# Patient Record
Sex: MALE | Race: WHITE | HISPANIC OR LATINO | URBAN - METROPOLITAN AREA
[De-identification: names, ages, dates, MRNs, and addresses within clinical notes are randomized per-mention and may not be internally consistent; named-entity substitution may affect disease eponyms.]

---

## 2021-10-21 ENCOUNTER — EMERGENCY (EMERGENCY)
Facility: HOSPITAL | Age: 31
LOS: 1 days | Discharge: HOME | End: 2021-10-21
Attending: EMERGENCY MEDICINE
Payer: COMMERCIAL

## 2021-10-21 VITALS
HEIGHT: 69 IN | HEART RATE: 73 BPM | TEMPERATURE: 99 F | WEIGHT: 154.98 LBS | SYSTOLIC BLOOD PRESSURE: 169 MMHG | RESPIRATION RATE: 18 BRPM | OXYGEN SATURATION: 100 % | DIASTOLIC BLOOD PRESSURE: 93 MMHG

## 2021-10-21 PROCEDURE — 99284 EMERGENCY DEPT VISIT MOD MDM: CPT

## 2021-10-21 PROCEDURE — 99283 EMERGENCY DEPT VISIT LOW MDM: CPT

## 2021-10-21 RX ORDER — METHOCARBAMOL 500 MG/1
1 TABLET, FILM COATED ORAL
Qty: 16 | Refills: 0
Start: 2021-10-21 | End: 2021-10-24

## 2021-10-21 RX ORDER — METHOCARBAMOL 500 MG/1
750 TABLET, FILM COATED ORAL ONCE
Refills: 0 | Status: COMPLETED | OUTPATIENT
Start: 2021-10-21 | End: 2021-10-21

## 2021-10-21 RX ORDER — ACETAMINOPHEN 500 MG
650 TABLET ORAL ONCE
Refills: 0 | Status: COMPLETED | OUTPATIENT
Start: 2021-10-21 | End: 2021-10-21

## 2021-10-21 RX ORDER — LIDOCAINE 4 G/100G
1 CREAM TOPICAL ONCE
Refills: 0 | Status: COMPLETED | OUTPATIENT
Start: 2021-10-21 | End: 2021-10-21

## 2021-10-21 RX ORDER — LIDOCAINE 4 G/100G
1 CREAM TOPICAL
Qty: 5 | Refills: 0
Start: 2021-10-21 | End: 2021-10-25

## 2021-10-21 RX ADMIN — LIDOCAINE 1 PATCH: 4 CREAM TOPICAL at 09:41

## 2021-10-21 RX ADMIN — Medication 650 MILLIGRAM(S): at 09:40

## 2021-10-21 RX ADMIN — METHOCARBAMOL 750 MILLIGRAM(S): 500 TABLET, FILM COATED ORAL at 09:40

## 2021-10-21 NOTE — ED PROVIDER NOTE - PATIENT PORTAL LINK FT
You can access the FollowMyHealth Patient Portal offered by [facility] by registering at the following website: [email or web address]. By joining [employer]’s FollowMyHealth portal, you will also be able to view your health information using other applications (apps) compatible with our system.

## 2021-10-21 NOTE — ED PROVIDER NOTE - OBJECTIVE STATEMENT
31 year old male with no PMHx presents to the ED for R sided lower back pain x 1 day which started after the pt lifted something heavy. Denies fevers, chills, bowl or bladder incontinence, weakness or numbness. Pt is able to walk. Took Motrin prior to arrival to the ED. No h/o injuries to the back.

## 2021-10-21 NOTE — ED ADULT TRIAGE NOTE - CHIEF COMPLAINT QUOTE
pt a+ox3, ambulatory into ED c/o low back pain since this morning. denies any falls or recent trauma.

## 2021-10-21 NOTE — ED PROVIDER NOTE - ATTENDING CONTRIBUTION TO CARE
HPI: Pt with low back pain x 1 day s/p moving heavy container, radiating down R leg, worse with bending, took ibuprofen this morning. No urinary incontinence/retention, no saddle anesthesia, no fevers, no h/o IVDU. No numbness/tingling/weakness.     PE:  Gen: NAD  Head: NCAT  HEENT: Oral mucosa moist, normal conjunctiva  CV: RRR no murmurs, normal perfusion  Resp: CTA b/l, no wheezing, rales, rhonchi, no respiratory distress  GI: Abd Soft NTND  Neuro: , no midline c/t/l spine tenderness, strength 5/5 in all 4 extremities, sensation intact throughout  MSK: FROM all 4 extremities, no deformity  Skin: No rash, no bruising  Psych: Normal affect    MDM: Patient with low back pain x 1 day, likely radicular pain, no red flags, neuro intact, dc home with spine f/u. Shannan Adams DO     I performed a history and physical exam of the patient and discussed their management with the advanced care provider. I reviewed the advanced care provider's note and agree with the documented findings and plan of care. My medical decision making and objective findings are found above.

## 2021-10-21 NOTE — ED PROVIDER NOTE - NSFOLLOWUPINSTRUCTIONS_ED_ALL_ED_FT
Take medication as prescribed  Follow up with primary medical doctor in 2-3 days     Back Pain    Back pain is very common in adults. The cause of back pain is rarely dangerous and the pain often gets better over time. The cause of your back pain may not be known and may include strain of muscles or ligaments, degeneration of the spinal disks, or arthritis. Occasionally the pain may radiate down your leg(s). Over-the-counter medicines to reduce pain and inflammation are often the most helpful. Stretching and remaining active frequently helps the healing process.     SEEK IMMEDIATE MEDICAL CARE IF YOU HAVE ANY OF THE FOLLOWING SYMPTOMS: bowel or bladder control problems, unusual weakness or numbness in your arms or legs, nausea or vomiting, abdominal pain, fever, dizziness/lightheadedness.

## 2021-10-21 NOTE — ED PROVIDER NOTE - NSFOLLOWUPCLINICS_GEN_ALL_ED_FT
[id number] [place] [address]  Phone: [phone]  Fax:      [id number] [place] [address]  Phone: [phone]  Fax:     [facility]  [facility]  [address] [place] [id number]  Phone: [phone]  Fax:

## 2021-10-29 PROBLEM — Z00.00 ENCOUNTER FOR PREVENTIVE HEALTH EXAMINATION: Status: ACTIVE | Noted: 2021-10-29

## 2021-10-30 ENCOUNTER — APPOINTMENT (OUTPATIENT)
Dept: ORTHOPEDIC SURGERY | Facility: CLINIC | Age: 31
End: 2021-10-30
Payer: COMMERCIAL

## 2021-10-30 VITALS
HEART RATE: 71 BPM | DIASTOLIC BLOOD PRESSURE: 99 MMHG | WEIGHT: 190 LBS | HEIGHT: 70 IN | TEMPERATURE: 98.1 F | SYSTOLIC BLOOD PRESSURE: 154 MMHG | BODY MASS INDEX: 27.2 KG/M2

## 2021-10-30 DIAGNOSIS — Z83.3 FAMILY HISTORY OF DIABETES MELLITUS: ICD-10-CM

## 2021-10-30 DIAGNOSIS — F17.200 NICOTINE DEPENDENCE, UNSPECIFIED, UNCOMPLICATED: ICD-10-CM

## 2021-10-30 DIAGNOSIS — M54.50 LOW BACK PAIN, UNSPECIFIED: ICD-10-CM

## 2021-10-30 DIAGNOSIS — Z78.9 OTHER SPECIFIED HEALTH STATUS: ICD-10-CM

## 2021-10-30 PROCEDURE — 99072 ADDL SUPL MATRL&STAF TM PHE: CPT

## 2021-10-30 PROCEDURE — 99203 OFFICE O/P NEW LOW 30 MIN: CPT

## 2021-10-30 PROCEDURE — 72100 X-RAY EXAM L-S SPINE 2/3 VWS: CPT

## 2021-10-30 RX ORDER — IBUPROFEN 800 MG/1
TABLET, FILM COATED ORAL
Refills: 0 | Status: ACTIVE | COMMUNITY

## 2021-10-30 RX ORDER — METHOCARBAMOL 750 MG/1
750 TABLET, FILM COATED ORAL
Qty: 15 | Refills: 0 | Status: ACTIVE | COMMUNITY
Start: 2021-10-30 | End: 1900-01-01

## 2021-10-30 NOTE — PHYSICAL EXAM
[de-identified] : The patient appears well nourished and in no apparent distress. The patient is alert and oriented to person, place, and time. Affect and mood appear normal. The head is normocephalic and atraumatic. Skin shows normal turgor with no evidence of eczema or psoriasis. No respiratory distress noted. Sensation grossly intact. MUSCULOSKELETAL:   SEE BELOW\par \par NEUROLOGIC: There are no pathologic reflexes. There is no decrease in sensation of the bilateral lower extremities to light touch. Deep tendon reflexes are well maintained at 2+/4 of the bilateral lower extremities and are symmetric.  No clonus\par \par \par MUSCULOSKELETAL: There is no visible muscular atrophy. Manual motor strength is well maintained in the bilateral lower extremities.  Range of motion of lumbar spine is well maintained. The patient ambulates in a non-myelopathic manner.  Negative tension sign and straight leg raise bilaterally.  Quad extension, ankle dorsiflexion, EHL, plantar flexion, and ankle eversion are well preserved.  Normal secondary orthopaedic exam of bilateral hips, greater trochanteric area, knees and ankles.  [de-identified] : 2 view lumbar spine x-rays were reviewed.  No degenerative changes.  Normal alignment.  Normal disc space.

## 2021-10-30 NOTE — HISTORY OF PRESENT ILLNESS
[de-identified] : Patient presents today for initial value of low back pain.  He reports of having the pain for about 2 weeks.  Started after leaning forward to  a box.  He states the pain has improved since it initially started.  This is the second episode in about 2 months.  It was radicular early on to the right lower leg.  He states that is improved.  He is taking ibuprofen.  He reports the back pain is mid lower back.  It is not upper back.  He denies of any associated motor weaknesses distally.  He is not using a cane or a walker.  No past back injections or surgery are reported.\par \par Review of Systems-\par Constitutional: No fever or chills. \par Cardiovascular: No orthopnea or chest pain\par Pulmonary: No shortness of breath. \par GI: No nausea or vomiting or abdominal pain.\par Musculoskeletal: see HPI \par Psychiatric: No anxiety and depression.

## 2021-10-30 NOTE — DISCUSSION/SUMMARY
[de-identified] : More than 30 minutes was spent reviewing the x-rays as well as discussing with the patient their clinical presentation, diagnosis and providing education.  The patient was informed that his back pain is likely coming from the paravertebral lumbar spine.  At this time he does not have an exam consistent with herniated disc.  He is improving.  He is prescribed additional anti-inflammatories and muscle relaxers for nighttime use.  Instruction and education was provided he is also recommended physical therapy.  It is anticipated he will continue to improve.  If he is not improved in 4 weeks he will return back to the office for reevaluation.  He may be indicated for advanced imaging at that time.  He will continue with activities as tolerated.  The patient demonstrated his understanding of the treatment plan.
No

## 2021-11-01 PROBLEM — M54.50 LOW BACK PAIN: Status: ACTIVE | Noted: 2021-10-30

## 2022-08-08 ENCOUNTER — EMERGENCY (EMERGENCY)
Facility: HOSPITAL | Age: 32
LOS: 1 days | Discharge: HOME | End: 2022-08-08
Attending: EMERGENCY MEDICINE
Payer: COMMERCIAL

## 2022-08-08 VITALS
OXYGEN SATURATION: 99 % | SYSTOLIC BLOOD PRESSURE: 152 MMHG | RESPIRATION RATE: 18 BRPM | HEIGHT: 72 IN | HEART RATE: 67 BPM | DIASTOLIC BLOOD PRESSURE: 89 MMHG | TEMPERATURE: 98 F | WEIGHT: 192.02 LBS

## 2022-08-08 PROCEDURE — 99283 EMERGENCY DEPT VISIT LOW MDM: CPT

## 2022-08-08 PROCEDURE — 99284 EMERGENCY DEPT VISIT MOD MDM: CPT

## 2022-08-08 RX ORDER — IBUPROFEN 200 MG
600 TABLET ORAL ONCE
Refills: 0 | Status: COMPLETED | OUTPATIENT
Start: 2022-08-08 | End: 2022-08-08

## 2022-08-08 RX ORDER — IBUPROFEN 200 MG
1 TABLET ORAL
Qty: 16 | Refills: 0
Start: 2022-08-08 | End: 2022-08-11

## 2022-08-08 RX ADMIN — Medication 600 MILLIGRAM(S): at 15:10

## 2022-08-08 NOTE — ED PROVIDER NOTE - CARE PROVIDER_API CALL
Kevin Stover (DO)  Orthopaedic Surgery  [address] [place] [address], [address]  [place] [address]  Phone: [phone]  Fax: [phone]  Follow Up Time:

## 2022-08-08 NOTE — ED ADULT NURSE NOTE - NS ED NURSE LEVEL OF CONSCIOUSNESS AFFECT
Sun Protection Tips    Apply broad spectrum water resistant sunscreen with an SPF of at least 30 to exposed areas of the skin. Dont forget the ears and lips! Remember to reapply sunscreen about every 2 hours and after swimming or sweating. Wear sun protective clothing. Swim shirts (aka. rash guards) are a great idea and negates the need to reapply sunscreen in those areas. Seek the shade whenever possible especially between the hours of 10am and 4pm when the suns rays are the strongest.     Avoid tanning beds          Wear a wide brim hat while in the sun    Cryosurgery (Freezing) Wound Care Instructions    AFTER THE PROCEDURE:    You will notice swelling and redness around the site. This is normal.    You may experience a sharp or sore feeling for the next several days. For this discomfort, you may take acetaminophen (Tylenol©).  A blister may develop at the treated area, sometimes as soon as by the end of the day. After several days, the blister will subside and a scab will form.  If the area is bumped or traumatized during the first few days following freezing, you may develop bleeding into the blister, forming a blood blister. This is nothing to be alarmed about.  If the blister is tense, uncomfortable, or much larger than the site that was frozen, you may pop the blister along its edge with a sterile needle (boiled, heated under a flame, or cleaned with alcohol) to allow the fluid to drain out. If the blister does not bother you, no treatment is needed.  Do NOT peel off the top of the blister roof. It will act as a dressing on top of your wound. WOUND CARE:    You may shower or bathe as usual, but avoid scrubbing the areas that have been frozen.  Cleanse the site twice a day with mild soapy water, and then apply a thin film of white petrolatum (Vaseline©).  You do not need to cover the area, but can if you prefer.     Do NOT allow the site to become dry or crusted, or attempt to
Calm

## 2022-08-08 NOTE — ED PROVIDER NOTE - NS ED ATTENDING STATEMENT MOD
I have seen and examined this patient and fully participated in the care of this patient as the teaching attending.  The service was shared with the SIERRA.  I reviewed and verified the documentation and independently performed the documented:

## 2022-08-08 NOTE — ED PROVIDER NOTE - ATTENDING CONTRIBUTION TO CARE
AJM: pt with atraumatic back pain. normal neuro exam. no red flag back pain signs. no need for emergent imaging. will treat symptoms and dc with outpatient follow up

## 2022-08-08 NOTE — ED ADULT NURSE NOTE - NSIMPLEMENTINTERV_GEN_ALL_ED
Implemented All Universal Safety Interventions:  Ash to call system. Call bell, personal items and telephone within reach. Instruct patient to call for assistance. Room bathroom lighting operational. Non-slip footwear when patient is off stretcher. Physically safe environment: no spills, clutter or unnecessary equipment. Stretcher in lowest position, wheels locked, appropriate side rails in place.

## 2022-08-08 NOTE — ED PROVIDER NOTE - OBJECTIVE STATEMENT
33 y/o M with no significant PMHx presents to ED c/o right lower back pain x 4-5 days. 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 States the pain is intermittent, worse with movement, now a 4/10 in severity. He has been taking a muscle relaxer he was prescribed from a similar complaint, denies use of medication today. Reports pain radiates into right buttock. Pt denies fever, chills, HA, neck pain, CP, SOB, N/V/D, abdominal pain, dysuria, saddle anesthesia, bowel/bladder incontinence, extremity numbness/weakness/tingling.

## 2022-08-08 NOTE — ED PROVIDER NOTE - PHYSICAL EXAMINATION
PHYSICAL EXAM:  GENERAL: Well appearing, sitting on cough comfortably in NAD  HEAD:  Atraumatic, Normocephalic  EYES: EOMI, PERRLA, conjunctiva and sclera clear  ENT: Moist mucous membranes  NECK: Supple, No JVD  CHEST/LUNG: Clear to auscultation bilaterally; No rales, rhonchi, wheezing, or rubs. Unlabored respirations  HEART: Regular rate and rhythm; No murmurs, rubs, or gallops  ABDOMEN: Bowel sounds present; Soft, Nontender, Nondistended  EXTREMITIES:  2+ Peripheral Pulses, brisk capillary refill. No clubbing, cyanosis, or edema  NERVOUS SYSTEM:  Alert & Oriented X3, speech clear. No deficits   MSK: +right lumbar paraspinal tenderness to palpation, full ROM, neurologically intact, no bony midline tenderness. FROM all 4 extremities, full and equal strength  SKIN: No rashes or lesions

## 2022-08-08 NOTE — ED PROVIDER NOTE - CLINICAL SUMMARY MEDICAL DECISION MAKING FREE TEXT BOX
33 y/o M with no significant PMHx presents to ED c/o right lower back pain x 4-5 days. Hx of heavy lifting at work, no acute trauma. +Right lumbar paraspinal TTP with full ROM, neurologically intact, no bony midline tenderness of spine. 33 y/o M with no significant PMHx presents to ED c/o right lower back pain x 4-5 days. Hx of heavy lifting at work, no acute trauma. +Right lumbar paraspinal TTP with full ROM, neurologically intact, no bony midline tenderness of spine.  Likely lumbar muscle strain. Will give pain control and d/c with muscle relaxer and number for spine specialist to follow up with.

## 2022-08-08 NOTE — ED PROVIDER NOTE - PATIENT PORTAL LINK FT
You can access the FollowMyHealth Patient Portal offered by [facility] by registering at the following website: [email or web address]. By joining [employer]’s FollowMyHealth portal, you will also be able to view your health information using other applications (apps) compatible with our system.

## 2022-08-11 ENCOUNTER — APPOINTMENT (OUTPATIENT)
Age: 32
End: 2022-08-11

## 2022-08-11 VITALS
SYSTOLIC BLOOD PRESSURE: 139 MMHG | HEART RATE: 71 BPM | BODY MASS INDEX: 27.2 KG/M2 | HEIGHT: 70 IN | WEIGHT: 190 LBS | DIASTOLIC BLOOD PRESSURE: 93 MMHG

## 2022-08-11 PROCEDURE — 99203 OFFICE O/P NEW LOW 30 MIN: CPT

## 2022-08-11 RX ORDER — KETOROLAC TROMETHAMINE 10 MG/1
10 TABLET, FILM COATED ORAL 3 TIMES DAILY
Qty: 15 | Refills: 0 | Status: DISCONTINUED | COMMUNITY
Start: 2021-10-30 | End: 2022-08-11

## 2022-08-11 NOTE — PHYSICAL EXAM
[FreeTextEntry1] : General: NAD, alert\par Psych: normal mood and affect\par HEENT: NC/AT, normal visual tracking\par Pulmonary: no resp distress, chest expansion appears symmetrical\par CV: extremities are warm and perfused\par Abd: non-distended\par Ext: no c/c/e\par normal skin color and appearance\par \par Lumbar/Hip Spine:\par Inspection: normal muscle bulk without asymmetry\par Tenderness to palpation: none\par ROM: within functional limits and without pain\par MMT: 5/5 bilateral lower extremities\par Reflexes: 2+ symmetric bilateral Achilles and patella \par Sensory: intact to light touch in all dermatomes of the bilateral lower\par extremities.\par Provocative testing:\par Straight leg raise negative\par FILIBERTO/FADIR test negative\par SI provocative testing negative\par Able to heel and toe walk

## 2022-08-11 NOTE — HISTORY OF PRESENT ILLNESS
[FreeTextEntry1] : Patient is a 31yo M currently smoker who presents for initial evaluation of low back pain for 5 days. Patient reports waking up with back pain without inciting injury or event. He reports going to [facility] ER on Tuesday for right-sided low back pain with radiation into the right buttock. No numbness or tingling, denies weakness in the lower extremities. Patient was given a lidocaine patch and ibuprofen sent to his pharmacy, but he has not picked up the medication. Patient reports similar pain episode happened six months ago. At the time, patient saw ortho and had lumbar xray which showed no acute fracture. Pain got better within 1 week with Robaxin, ketorolac and ibuprofen. Patient reports he resumed the leftover Robaxin for the first two days. He hasn't taken anything since then. He reports waking up today without pain.\par \par Patient works as . Job requires heavy lifting up to 50 lbs.\par No recent PT, no history of spine surgery or injections. No medication allergies. No history of liver or kidney dysfunction, heart disease, GI bleeding, gastritis or GERD.

## 2022-08-11 NOTE — ASSESSMENT
[FreeTextEntry1] : 32 y.o. M current smoker who presents for initial evaluation of low back pain that started 5 days ago and has since resolved. Patient reports a similar episode of low back pain six months ago that resolved on its own after 1 week. I spent most of today's office visit (30 min) reviewing the patient's history, discussing etiology, pathogenesis, further diagnostic work-up and non-operative management. Advised caution w/ chronic use of acetaminophen and PO NSAIDs 2' GI/cardiac/renal toxicities. Patient given home exercises for low back pain prevention and advised on ergonomics for lifting. If patient continues to have these pain episodes, will consider formal course of P.T. for core strengthening. Patient in agreement with plan. All questions answered. RTC as needed.